# Patient Record
Sex: MALE | Race: AMERICAN INDIAN OR ALASKA NATIVE
[De-identification: names, ages, dates, MRNs, and addresses within clinical notes are randomized per-mention and may not be internally consistent; named-entity substitution may affect disease eponyms.]

---

## 2020-06-14 ENCOUNTER — HOSPITAL ENCOUNTER (EMERGENCY)
Dept: HOSPITAL 5 - ED | Age: 60
Discharge: HOME | End: 2020-06-14
Payer: COMMERCIAL

## 2020-06-14 VITALS — SYSTOLIC BLOOD PRESSURE: 168 MMHG | DIASTOLIC BLOOD PRESSURE: 89 MMHG

## 2020-06-14 DIAGNOSIS — Z79.899: ICD-10-CM

## 2020-06-14 DIAGNOSIS — I10: Primary | ICD-10-CM

## 2020-06-14 DIAGNOSIS — J45.909: ICD-10-CM

## 2020-06-14 DIAGNOSIS — R42: ICD-10-CM

## 2020-06-14 LAB
ALBUMIN SERPL-MCNC: 4.4 G/DL (ref 3.9–5)
ALT SERPL-CCNC: 26 UNITS/L (ref 7–56)
BASOPHILS # (AUTO): 0 K/MM3 (ref 0–0.1)
BASOPHILS NFR BLD AUTO: 0.5 % (ref 0–1.8)
BILIRUB UR QL STRIP: (no result)
BLOOD UR QL VISUAL: (no result)
BUN SERPL-MCNC: 19 MG/DL (ref 9–20)
BUN/CREAT SERPL: 19 %
CALCIUM SERPL-MCNC: 9.3 MG/DL (ref 8.4–10.2)
EOSINOPHIL # BLD AUTO: 0 K/MM3 (ref 0–0.4)
EOSINOPHIL NFR BLD AUTO: 0.6 % (ref 0–4.3)
HCT VFR BLD CALC: 41.5 % (ref 35.5–45.6)
HEMOLYSIS INDEX: 32
HGB BLD-MCNC: 13.8 GM/DL (ref 11.8–15.2)
LYMPHOCYTES # BLD AUTO: 2.5 K/MM3 (ref 1.2–5.4)
LYMPHOCYTES NFR BLD AUTO: 30.3 % (ref 13.4–35)
MCHC RBC AUTO-ENTMCNC: 33 % (ref 32–34)
MCV RBC AUTO: 85 FL (ref 84–94)
MONOCYTES # (AUTO): 0.6 K/MM3 (ref 0–0.8)
MONOCYTES % (AUTO): 6.9 % (ref 0–7.3)
PH UR STRIP: 7 [PH] (ref 5–7)
PLATELET # BLD: 184 K/MM3 (ref 140–440)
PROT UR STRIP-MCNC: (no result) MG/DL
RBC # BLD AUTO: 4.86 M/MM3 (ref 3.65–5.03)
RBC #/AREA URNS HPF: 1 /HPF (ref 0–6)
UROBILINOGEN UR-MCNC: < 2 MG/DL (ref ?–2)
WBC #/AREA URNS HPF: < 1 /HPF (ref 0–6)

## 2020-06-14 PROCEDURE — 96374 THER/PROPH/DIAG INJ IV PUSH: CPT

## 2020-06-14 PROCEDURE — 99284 EMERGENCY DEPT VISIT MOD MDM: CPT

## 2020-06-14 PROCEDURE — 80053 COMPREHEN METABOLIC PANEL: CPT

## 2020-06-14 PROCEDURE — 81001 URINALYSIS AUTO W/SCOPE: CPT

## 2020-06-14 PROCEDURE — 93005 ELECTROCARDIOGRAM TRACING: CPT

## 2020-06-14 PROCEDURE — 36415 COLL VENOUS BLD VENIPUNCTURE: CPT

## 2020-06-14 PROCEDURE — 84484 ASSAY OF TROPONIN QUANT: CPT

## 2020-06-14 PROCEDURE — 85025 COMPLETE CBC W/AUTO DIFF WBC: CPT

## 2020-06-14 NOTE — EVENT NOTE
ED Screening Note


ED Screening Note: 





lightheadedness began 45 minutes ago 


he states for a couple of seconds he had tingling in his left thumb but that 

completely resolved


he states he has not taken his BP medication in 3 days


he states he has been taking steroids and pain medicine for back pain and did 

not think he could take his blood pressure medication 


no numbness


no weakness, no ha


no vision changes


no speech disturbance


no gait disturbance





normal finger to nose, normal heel to shin, CN II-XII intact, 5/5 muscle 

strength in the BUE/BLE, sensation intact, normal tandem walking, normal gait, 

no focal neuro deficit








This initial assessment/diagnostic orders/clinical plan/treatment(s) is/are 

subject to change based on patients health status, clinical progression and re-

assessment by fellow clinical providers in the ED. Further treatment and workup 

at subsequent clinical providers discretion. Patient/guardian urged not to elope

from the ED as their condition may be serious if not clinically assessed and 

managed. 





Initial orders include: 


HTN urgency protocol 





MAIN evaluation by MAIN ED


charge nurse notified needs room ASAP


discussed with Dr. Bradshaw

## 2020-06-14 NOTE — EMERGENCY DEPARTMENT REPORT
ED General Adult HPI





- General


Chief complaint: Dizziness


Stated complaint: WEAKNESS


PUI?: No


Time Seen by Provider: 06/14/20 18:08


Source: patient


Mode of arrival: Ambulatory


Limitations: No Limitations





- History of Present Illness


Initial comments: 





Patient is a 60-year-old male that presents emergency room with complaints of e

levated blood pressure.  Patient states approximately 45 minutes prior to 

arrival he has had some dizziness and left thumb numbness.  Patient states that 

the symptoms resolved after lasting approximately 10 minutes.  Patient states he

is not having any chest pain.  Patient denies slurred speech.  Patient denies 

visual change.  Patient denies numbness.  Patient denies tingling.  Patient 

denies shortness of breath.  Patient states that he stopped taking his blood 

pressure medications 3 days ago.  Patient states he stopped taking it because 

his primary care started him on steroids and a muscle relaxer and he was not 

sure if they can be taken together.  Patient denies blurry vision.  Patient den

ies any physical symptoms.








Patient denies recent travel.  Patient denies recent international travel.  

Patient denies exposure to the novel coronavirus.  Patient denies sick contacts.

 Patient denies fever and chills.  Patient denies cough.  Patient denies 

diarrhea.  Patient denies coming in contact with anybody with symptoms of the 

novel coronavirus.








-: Sudden


Severity scale (0 -10): 0


Consistency: now resolved


Improves with: none


Worsens with: none


Associated Symptoms: denies: confusion, chest pain, cough, diaphoresis, 

fever/chills, headaches, loss of appetite, malaise, nausea/vomiting, rash, 

seizure, shortness of breath, syncope, weakness


Treatments Prior to Arrival: none





- Related Data


                                Home Medications











 Medication  Instructions  Recorded  Confirmed  Last Taken


 


Losartan/Hydrochlorothiazide 1 each PO QDAY 10/16/13 10/16/13 Unknown





[Hyzaar 50-12.5]    


 


Pravastatin [Pravachol] 80 mg PO QHS 10/16/13 10/16/13 Unknown


 


amLODIPine [Norvasc] 10 mg PO DAILY 10/16/13 10/16/13 Unknown








                                  Previous Rx's











 Medication  Instructions  Recorded  Last Taken  Type


 


Sulfamethoxazole/Trimethoprim 1 each PO BID #14 tablet 10/17/13 Unknown Rx





[Bactrim DS]    











                                    Allergies











Allergy/AdvReac Type Severity Reaction Status Date / Time


 


No Known Allergies Allergy   Verified 06/14/20 17:58














ED Review of Systems


ROS: 


Stated complaint: WEAKNESS


Other details as noted in HPI





Constitutional: denies: chills, fever


Eyes: denies: eye pain, eye discharge, vision change


ENT: denies: ear pain, throat pain


Respiratory: denies: cough, shortness of breath, wheezing


Cardiovascular: denies: chest pain, palpitations


Endocrine: no symptoms reported


Gastrointestinal: denies: abdominal pain, nausea, diarrhea


Genitourinary: denies: urgency, dysuria


Musculoskeletal: denies: back pain, joint swelling, arthralgia


Skin: denies: rash, lesions


Neurological: as per HPI.  denies: headache, weakness, paresthesias


Psychiatric: denies: anxiety, depression


Hematological/Lymphatic: denies: easy bleeding, easy bruising





ED Past Medical Hx





- Past Medical History


Previous Medical History?: Yes


Hx Hypertension: Yes


Hx Asthma: Yes





- Surgical History


Past Surgical History?: No





- Family History


Family history: no significant





- Social History


Smoking Status: Never Smoker


Substance Use Type: Alcohol





- Medications


Home Medications: 


                                Home Medications











 Medication  Instructions  Recorded  Confirmed  Last Taken  Type


 


Losartan/Hydrochlorothiazide 1 each PO QDAY 10/16/13 10/16/13 Unknown History





[Hyzaar 50-12.5]     


 


Pravastatin [Pravachol] 80 mg PO QHS 10/16/13 10/16/13 Unknown History


 


amLODIPine [Norvasc] 10 mg PO DAILY 10/16/13 10/16/13 Unknown History


 


Sulfamethoxazole/Trimethoprim 1 each PO BID #14 tablet 10/17/13  Unknown Rx





[Bactrim DS]     














ED Physical Exam





- General


Limitations: No Limitations


General appearance: alert, in no apparent distress





- Head


Head exam: Present: atraumatic, normocephalic





- Eye


Eye exam: Present: normal appearance, PERRL, EOMI


Pupils: Present: normal accommodation





- ENT


ENT exam: Present: mucous membranes moist





- Neck


Neck exam: Present: normal inspection, full ROM.  Absent: tenderness, 

meningismus, lymphadenopathy, thyromegaly





- Respiratory


Respiratory exam: Present: normal lung sounds bilaterally.  Absent: respiratory 

distress, wheezes, rales, rhonchi, stridor





- Cardiovascular


Cardiovascular Exam: Present: regular rate, normal rhythm.  Absent: systolic 

murmur, diastolic murmur, rubs, gallop





- GI/Abdominal


GI/Abdominal exam: Present: soft, normal bowel sounds.  Absent: distended, 

tenderness, guarding, rebound





- Rectal


Rectal exam: Present: deferred





- Extremities Exam


Extremities exam: Present: normal inspection, full ROM, normal capillary refill.

 Absent: tenderness, calf tenderness





- Back Exam


Back exam: Present: normal inspection, full ROM.  Absent: tenderness, CVA 

tenderness (R), CVA tenderness (L)





- Neurological Exam


Neurological exam: Present: alert, oriented X3, CN II-XII intact, normal gait, 

reflexes normal.  Absent: motor sensory deficit





- Psychiatric


Psychiatric exam: Present: normal affect, normal mood





- Skin


Skin exam: Present: warm, dry, intact, normal color.  Absent: rash





ED Course


                                   Vital Signs











  06/14/20 06/14/20 06/14/20





  18:00 18:51 19:25


 


Temperature 97.9 F  


 


Pulse Rate 86 78 81


 


Respiratory 20  20





Rate   


 


Blood Pressure 239/137 187/105 


 


Blood Pressure   160/84





[Left]   


 


O2 Sat by Pulse 99  99





Oximetry   














- Reevaluation(s)


Reevaluation #1: 


Patient denies symptoms.  Patient denies chest pain.  Patient denies complaints.

  Patient's blood pressure is better.





I discussed all results and clinical findings with patient.  I discussed plan of

 care with patient.  Patient agrees with plan of care.  Patient is stable for 

discharge.  Patient will be discharged home.  Patient given discharge 

instructions.  Patient voiced understanding of discharge instructions.


06/14/20 20:08








ED Medical Decision Making





- Lab Data


Result diagrams: 


                                 06/14/20 18:47





                                 06/14/20 18:47





- EKG Data


-: EKG Interpreted by Me


EKG shows normal: sinus rhythm, axis, intervals, QRS complexes, ST-T waves


Rate: normal





- EKG Data


Interpretation: LVH





- Medical Decision Making





Patient is 60-year-old male that presents emergency room with complaints of 

dizziness and numbness to the left thumb.  Patient symptoms resolved prior to 

arrival at the hospital.  Patient neuro exam completely intact.  Patient not 

have any stroke symptoms.  Patient denies any chest pain or shortness of breath.

  Patient had labs, which were essentially unremarkable.  Patient's troponin was

 negative.  Patient EKG did not show a STEMI.  Patient was given hydralazine as 

blood pressure responded well.  Patient instructed to restart his home blood 

pressure medications.  Patient states he has plenty only stopped them because of

 him taking steroids and muscle relaxers for back sprain.





- Differential Diagnosis


Noncompliance, missed doses of MEDS, dizziness, numbneSS, HTN ESTUARDO


Critical Care Time: Yes


Critical care time in (mins) excluding proc time.: 35


Critical care attestation.: 


If time is entered above; I have spent that time in minutes in the direct care 

of this critically ill patient, excluding procedure time.





Critical Care Time: 





35 MINUTES





ED Disposition


Clinical Impression: 


 Hypertensive emergency, no CHF, Noncompliance, Dizziness





Hypertension


Qualifiers:


 Hypertension type: essential hypertension Qualified Code(s): I10 - Essential 

(primary) hypertension





Disposition: DC-01 TO HOME OR SELFCARE


Is pt being admited?: No


Does the pt Need Aspirin: No


Condition: Stable


Instructions:  Hypertension (ED), Hypertensive Crisis (ED), DASH Eating Plan 

(ED), Low Sodium Diet (ED), How to Take a Blood Pressure  (ED), Heart Healthy 

Diet (ED)


Additional Instructions: 


Patient to follow-up with primary care in 2 to 3 days.   Patient to restart 

blood pressure medications.  Patient to rest.  Patient to increase water.  

Patient to avoid strenuous exercise or heavy lifting until cleared by primary ca

re.  Patient to monitor blood pressure at home.  Patient to keep a blood 

pressure log.  Patient to take blood pressure log to all follow-up appointments.

  Patient to eat a heart healthy and low-salt diet.  Patient to take meds as 

directed.  Patient to return to the ER if condition worsens, changes or new 

symptoms arise.


Referrals: 


PRIMARY CARE,MD [Primary Care Provider] - 2-3 Days


Time of Disposition: 20:14

## 2020-07-14 ENCOUNTER — CLAIMS CREATED FROM THE CLAIM WINDOW (OUTPATIENT)
Dept: URBAN - METROPOLITAN AREA CLINIC 4 | Facility: CLINIC | Age: 60
End: 2020-07-14
Payer: COMMERCIAL

## 2020-07-14 ENCOUNTER — OFFICE VISIT (OUTPATIENT)
Dept: URBAN - METROPOLITAN AREA SURGERY CENTER 23 | Facility: SURGERY CENTER | Age: 60
End: 2020-07-14
Payer: COMMERCIAL

## 2020-07-14 DIAGNOSIS — D12.5 ADENOMATOUS POLYP OF SIGMOID COLON: ICD-10-CM

## 2020-07-14 DIAGNOSIS — K63.89 BACTERIAL OVERGROWTH SYNDROME: ICD-10-CM

## 2020-07-14 DIAGNOSIS — Z86.010 H/O ADENOMATOUS POLYP OF COLON: ICD-10-CM

## 2020-07-14 DIAGNOSIS — D12.5 BENIGN NEOPLASM OF SIGMOID COLON: ICD-10-CM

## 2020-07-14 DIAGNOSIS — D17.9 BENIGN LIPOMATOUS NEOPLASM, UNSPECIFIED: ICD-10-CM

## 2020-07-14 PROCEDURE — G8907 PT DOC NO EVENTS ON DISCHARG: HCPCS | Performed by: INTERNAL MEDICINE

## 2020-07-14 PROCEDURE — 45380 COLONOSCOPY AND BIOPSY: CPT | Performed by: INTERNAL MEDICINE

## 2020-07-14 PROCEDURE — G9936 PMH PLYP/NEO CO/RECT/JUN/ANS: HCPCS | Performed by: INTERNAL MEDICINE

## 2020-07-14 PROCEDURE — 88305 TISSUE EXAM BY PATHOLOGIST: CPT | Performed by: PATHOLOGY

## 2020-07-14 PROCEDURE — 45385 COLONOSCOPY W/LESION REMOVAL: CPT | Performed by: INTERNAL MEDICINE

## 2020-09-26 ENCOUNTER — HOSPITAL ENCOUNTER (EMERGENCY)
Dept: HOSPITAL 5 - ED | Age: 60
Discharge: HOME | End: 2020-09-26
Payer: COMMERCIAL

## 2020-09-26 VITALS — SYSTOLIC BLOOD PRESSURE: 148 MMHG | DIASTOLIC BLOOD PRESSURE: 81 MMHG

## 2020-09-26 DIAGNOSIS — M54.42: Primary | ICD-10-CM

## 2020-09-26 DIAGNOSIS — J45.909: ICD-10-CM

## 2020-09-26 DIAGNOSIS — Z79.899: ICD-10-CM

## 2020-09-26 DIAGNOSIS — Z87.891: ICD-10-CM

## 2020-09-26 DIAGNOSIS — I10: ICD-10-CM

## 2020-09-26 PROCEDURE — 96372 THER/PROPH/DIAG INJ SC/IM: CPT

## 2020-09-26 PROCEDURE — 99281 EMR DPT VST MAYX REQ PHY/QHP: CPT

## 2020-09-26 NOTE — EMERGENCY DEPARTMENT REPORT
ED Back Pain/Injury HPI





- General


Chief Complaint: Back Pain/Injury


Stated Complaint: LEG PAIN


Time Seen by Provider: 09/26/20 09:15


Source: patient


Limitations: No Limitations





- History of Present Illness


Initial Comments: 





This is a 60-year-old male complaining low back pain radiating down the left 

leg.  These symptoms have been ongoing for 7 months.  He denies any known falls 

or trauma.  He last saw a chiropractor 3 months ago.  He is taking Celebrex at 

home with no relief.  Patient states he is working with his doctor to schedule 

physical therapy and if physical therapy does not improve his symptoms the plan 

is for him to have an MRI.  He reports having x-rays of his back states there 

are no abnormal findings.  Patient denies  bowel or bladder incontinence.  His 

past medical history is of asthma and hypertension.  Currently his pain level 

#10 (1-10 scale)


-: month(s) (7)


Similar Symptoms Previously: Yes


Radiation: left leg


Severity scale (0 -10): 10


Quality: aching


Consistency: constant


Improves With: none


Worsens With: immobilization, supine


Associated Symptoms: denies: confusion, weakness, chest pain, numbness, 

difficulty walking, cough, difficulty urinating, diaphoresis, incontinence, 

fever/chills, constipation, headaches, abdominal pain, loss of appetite, 

seizure, shortness of breath, syncope





- Related Data


                                Home Medications











 Medication  Instructions  Recorded  Confirmed  Last Taken


 


Losartan/Hydrochlorothiazide 1 each PO QDAY 10/16/13 10/16/13 Unknown





[Hyzaar 50-12.5]    


 


Pravastatin [Pravachol] 80 mg PO QHS 10/16/13 10/16/13 Unknown


 


amLODIPine [Norvasc] 10 mg PO DAILY 10/16/13 10/16/13 Unknown








                                  Previous Rx's











 Medication  Instructions  Recorded  Last Taken  Type


 


Sulfamethoxazole/Trimethoprim 1 each PO BID #14 tablet 10/17/13 Unknown Rx





[Bactrim DS]    


 


Metaxalone [Skelaxin] 800 mg PO TID #30 tablet 09/26/20 Unknown Rx


 


traMADoL [Ultram] 50 mg PO Q6HR PRN #15 tablet 09/26/20 Unknown Rx











                                    Allergies











Allergy/AdvReac Type Severity Reaction Status Date / Time


 


No Known Allergies Allergy   Verified 06/14/20 17:58














ED Review of Systems


ROS: 


Stated complaint: LEG PAIN


Other details as noted in HPI





Comment: All other systems reviewed and negative


Constitutional: denies: see HPI, fever


Eyes: denies: eye pain


ENT: denies: throat pain


Cardiovascular: denies: chest pain


Gastrointestinal: denies: abdominal pain, constipation


Genitourinary: denies: dysuria, frequency, hematuria, testicular pain


Musculoskeletal: back pain.  denies: myalgia


Neurological: denies: headache, numbness, paresthesias, confusion





ED Past Medical Hx





- Past Medical History


Previous Medical History?: Yes


Hx Hypertension: Yes


Hx Asthma: Yes


Additional medical history: Back and Pelvic pain





- Surgical History


Past Surgical History?: Yes


Additional Surgical History: Left foot





- Social History


Smoking Status: Former Smoker


Substance Use Type: Alcohol





- Medications


Home Medications: 


                                Home Medications











 Medication  Instructions  Recorded  Confirmed  Last Taken  Type


 


Losartan/Hydrochlorothiazide 1 each PO QDAY 10/16/13 10/16/13 Unknown History





[Hyzaar 50-12.5]     


 


Pravastatin [Pravachol] 80 mg PO QHS 10/16/13 10/16/13 Unknown History


 


amLODIPine [Norvasc] 10 mg PO DAILY 10/16/13 10/16/13 Unknown History


 


Sulfamethoxazole/Trimethoprim 1 each PO BID #14 tablet 10/17/13  Unknown Rx





[Bactrim DS]     


 


Metaxalone [Skelaxin] 800 mg PO TID #30 tablet 09/26/20  Unknown Rx


 


traMADoL [Ultram] 50 mg PO Q6HR PRN #15 tablet 09/26/20  Unknown Rx














ED Physical Exam





- General


Limitations: No Limitations


General appearance: alert, in no apparent distress





- Head


Head exam: Present: atraumatic





- Eye


Eye exam: Present: normal appearance





- ENT


ENT exam: Present: normal exam, mucous membranes moist





- Neck


Neck exam: Present: normal inspection, full ROM





- Respiratory


Respiratory exam: Present: normal lung sounds bilaterally





- Cardiovascular


Cardiovascular Exam: Present: regular rate, normal heart sounds





- GI/Abdominal


GI/Abdominal exam: Present: soft, normal bowel sounds.  Absent: tenderness





- Extremities Exam


Extremities exam: Present: normal inspection, other (ambulates without 

difficulty)





- Back Exam


Back exam: Present: normal inspection, full ROM, other (negative straight leg 

raises bilaterally).  Absent: CVA tenderness (R), CVA tenderness (L), paraspinal

 tenderness, vertebral tenderness





- Neurological Exam


Neurological exam: Present: alert, oriented X3





- Psychiatric


Psychiatric exam: Present: normal affect





- Skin


Skin exam: Present: warm, dry, intact, normal color





ED Course


                                   Vital Signs











  09/26/20





  08:56


 


Temperature 97.8 F


 


Pulse Rate 85


 


Respiratory 18





Rate 


 


Blood Pressure 148/81


 


O2 Sat by Pulse 98





Oximetry 














- Reevaluation(s)


Reevaluation #1: 





09/26/20 09:59


I observed patient walking in in his room without difficulty pain has decreased 

but not completely gone





ED Medical Decision Making





- Medical Decision Making





This 60-year-old gentleman has chronic back pain  for 7 months he is currently 

working with his primary care doctor to schedule physical therapy and an MRI.  

At home his pain is NOT relieved with Celebrex.  he was given Toradol 60 IM 

patient notified not to take any anti-inflammatories in the next 24 hours.  B

ased on examination and no history of trauma and no recent injuries.  Patient 

most likely has chronic low back pain  with sciatic nerve involvement.  I 

stressed the need for further follow up and work up with his PCP


Critical care attestation.: 


If time is entered above; I have spent that time in minutes in the direct care 

of this critically ill patient, excluding procedure time.








ED Disposition


Clinical Impression: 


Chronic back pain


Qualifiers:


 Back pain location: low back pain Back pain laterality: left Sciatica presence:

 with sciatica Sciatica laterality: sciatica of left side Qualified Code(s): M5

4.42 - Lumbago with sciatica, left side





Disposition: DC-01 TO HOME OR SELFCARE


Is pt being admited?: No


Does the pt Need Aspirin: No


Condition: Stable


Instructions:  Chronic Back Pain (ED)


Additional Instructions: 


Take medications as prescribed continue to follow-up with your primary care 

doctor to start physical therapy and possibly do an MRI.  Follow-up or return to

 the emergency room immediately if you develop inability to walk loss of your 

bladder or bowel function or inability to urinate


Prescriptions: 


Metaxalone [Skelaxin] 800 mg PO TID #30 tablet


traMADoL [Ultram] 50 mg PO Q6HR PRN #15 tablet


 PRN Reason: Pain


Referrals: 


PRIMARY CARE,MD [Referring] - 3-5 Days


LIZBETH NUNEZ MD [Staff Physician] - 3-5 Days


Time of Disposition: 09:53

## 2021-03-12 ENCOUNTER — DASHBOARD ENCOUNTERS (OUTPATIENT)
Age: 61
End: 2021-03-12

## 2021-03-12 ENCOUNTER — LAB OUTSIDE AN ENCOUNTER (OUTPATIENT)
Dept: URBAN - METROPOLITAN AREA CLINIC 118 | Facility: CLINIC | Age: 61
End: 2021-03-12

## 2021-03-12 ENCOUNTER — OFFICE VISIT (OUTPATIENT)
Dept: URBAN - METROPOLITAN AREA CLINIC 118 | Facility: CLINIC | Age: 61
End: 2021-03-12
Payer: COMMERCIAL

## 2021-03-12 DIAGNOSIS — R10.9 ABDOMINAL PAIN, UNSPECIFIED ABDOMINAL LOCATION: ICD-10-CM

## 2021-03-12 DIAGNOSIS — Z86.010 PERSONAL HISTORY OF COLONIC POLYPS: ICD-10-CM

## 2021-03-12 PROCEDURE — 99213 OFFICE O/P EST LOW 20 MIN: CPT | Performed by: INTERNAL MEDICINE

## 2021-03-12 RX ORDER — BISACODYL 5 MG
3 TABLET, DELAYED RELEASE (ENTERIC COATED) ORAL
Qty: 3 | Refills: 0 | OUTPATIENT
Start: 2021-03-12 | End: 2021-03-13

## 2021-03-12 RX ORDER — POLYETHYLENE GLYCOL 3350, SODIUM SULFATE ANHYDROUS, SODIUM BICARBONATE, SODIUM CHLORIDE, POTASSIUM CHLORIDE 236; 22.74; 6.74; 5.86; 2.97 G/4L; G/4L; G/4L; G/4L; G/4L
AS DIRECTED POWDER, FOR SOLUTION ORAL
Qty: 1 | Refills: 0 | OUTPATIENT
Start: 2021-03-12 | End: 2021-03-13

## 2021-03-12 NOTE — HPI-OTHER HISTORIES
The pt presents for colonoscopy evaluation.  h/o adenomatous polyps, had surveillance colonoscopy july 2020 with adenomatous polys removed but inadequate prep.  reports abd pain at times with certain po intake (syrup mainly) but otherwise denies gi complaints including gi bleeding, change in bowel habits, constipation, weight loss, upper gi complaints.

## 2021-03-16 ENCOUNTER — TELEPHONE ENCOUNTER (OUTPATIENT)
Dept: URBAN - METROPOLITAN AREA CLINIC 118 | Facility: CLINIC | Age: 61
End: 2021-03-16

## 2021-03-16 PROBLEM — 428283002: Status: ACTIVE | Noted: 2021-03-12

## 2021-03-16 RX ORDER — POLYETHYLENE GLYCOL 3350, SODIUM SULFATE, SODIUM CHLORIDE, POTASSIUM CHLORIDE, ASCORBIC ACID, SODIUM ASCORBATE 140-9-5.2G
AS DIRECTED KIT ORAL ONCE
Qty: 1 | Refills: 0 | OUTPATIENT
End: 2021-03-17

## 2021-03-22 ENCOUNTER — OFFICE VISIT (OUTPATIENT)
Dept: URBAN - METROPOLITAN AREA SURGERY CENTER 23 | Facility: SURGERY CENTER | Age: 61
End: 2021-03-22
Payer: COMMERCIAL

## 2021-03-22 DIAGNOSIS — Z86.010 H/O ADENOMATOUS POLYP OF COLON: ICD-10-CM

## 2021-03-22 PROCEDURE — G8907 PT DOC NO EVENTS ON DISCHARG: HCPCS | Performed by: INTERNAL MEDICINE

## 2021-03-22 PROCEDURE — 45378 DIAGNOSTIC COLONOSCOPY: CPT | Performed by: INTERNAL MEDICINE

## 2021-08-16 ENCOUNTER — P2P PATIENT RECORD (OUTPATIENT)
Age: 61
End: 2021-08-16

## 2022-09-09 ENCOUNTER — TELEPHONE ENCOUNTER (OUTPATIENT)
Dept: URBAN - METROPOLITAN AREA CLINIC 118 | Facility: CLINIC | Age: 62
End: 2022-09-09